# Patient Record
Sex: FEMALE | Race: BLACK OR AFRICAN AMERICAN | Employment: PART TIME | URBAN - METROPOLITAN AREA
[De-identification: names, ages, dates, MRNs, and addresses within clinical notes are randomized per-mention and may not be internally consistent; named-entity substitution may affect disease eponyms.]

---

## 2018-11-28 ENCOUNTER — HOSPITAL ENCOUNTER (OUTPATIENT)
Dept: MAMMOGRAPHY | Age: 48
Discharge: HOME | End: 2018-11-28
Attending: FAMILY MEDICINE
Payer: COMMERCIAL

## 2018-11-28 DIAGNOSIS — Z12.39 BREAST CANCER SCREENING: ICD-10-CM

## 2018-11-28 PROCEDURE — 77063 BREAST TOMOSYNTHESIS BI: CPT

## 2018-11-28 PROCEDURE — 77067 SCR MAMMO BI INCL CAD: CPT

## 2018-12-03 ENCOUNTER — HOSPITAL ENCOUNTER (OUTPATIENT)
Dept: LAB | Age: 48
Discharge: HOME | End: 2018-12-03
Payer: COMMERCIAL

## 2018-12-03 DIAGNOSIS — D50.9 MICROCYTIC ANEMIA: ICD-10-CM

## 2018-12-03 LAB
BASOPHILS # BLD: 0 K/UL (ref 0–0.2)
BASOPHILS NFR BLD: 0 % (ref 0–2)
DIFFERENTIAL METHOD BLD: ABNORMAL
EOSINOPHIL # BLD: 0.1 K/UL (ref 0–0.8)
EOSINOPHIL NFR BLD: 1 % (ref 0.5–7.8)
ERYTHROCYTE [DISTWIDTH] IN BLOOD BY AUTOMATED COUNT: 19.7 % (ref 11.9–14.6)
FERRITIN SERPL-MCNC: 7 NG/ML (ref 8–388)
HCT VFR BLD AUTO: 34.3 % (ref 35.8–46.3)
HGB BLD-MCNC: 10.6 G/DL (ref 11.7–15.4)
IMM GRANULOCYTES # BLD: 0 K/UL (ref 0–0.5)
IMM GRANULOCYTES NFR BLD AUTO: 0 % (ref 0–5)
IRON SATN MFR SERPL: 18 %
IRON SERPL-MCNC: 83 UG/DL (ref 35–150)
LYMPHOCYTES # BLD: 2.7 K/UL (ref 0.5–4.6)
LYMPHOCYTES NFR BLD: 39 % (ref 13–44)
MCH RBC QN AUTO: 24.4 PG (ref 26.1–32.9)
MCHC RBC AUTO-ENTMCNC: 30.9 G/DL (ref 31.4–35)
MCV RBC AUTO: 78.9 FL (ref 79.6–97.8)
MONOCYTES # BLD: 0.4 K/UL (ref 0.1–1.3)
MONOCYTES NFR BLD: 5 % (ref 4–12)
NEUTS SEG # BLD: 3.8 K/UL (ref 1.7–8.2)
NEUTS SEG NFR BLD: 55 % (ref 43–78)
NRBC # BLD: 0 K/UL (ref 0–0.2)
PLATELET # BLD AUTO: 315 K/UL (ref 150–450)
PMV BLD AUTO: 10.5 FL (ref 9.4–12.3)
RBC # BLD AUTO: 4.35 M/UL (ref 4.05–5.25)
TIBC SERPL-MCNC: 450 UG/DL (ref 250–450)
WBC # BLD AUTO: 7 K/UL (ref 4.3–11.1)

## 2018-12-03 PROCEDURE — 83540 ASSAY OF IRON: CPT

## 2018-12-03 PROCEDURE — 36415 COLL VENOUS BLD VENIPUNCTURE: CPT

## 2018-12-03 PROCEDURE — 85025 COMPLETE CBC W/AUTO DIFF WBC: CPT

## 2018-12-03 PROCEDURE — 82728 ASSAY OF FERRITIN: CPT

## 2019-03-04 ENCOUNTER — HOSPITAL ENCOUNTER (OUTPATIENT)
Dept: LAB | Age: 49
Discharge: HOME | End: 2019-03-04
Payer: COMMERCIAL

## 2019-03-04 DIAGNOSIS — D50.9 MICROCYTIC ANEMIA: ICD-10-CM

## 2019-03-04 DIAGNOSIS — D64.9 ANEMIA, UNSPECIFIED TYPE: ICD-10-CM

## 2019-03-04 LAB
ALBUMIN SERPL-MCNC: 4 G/DL (ref 3.5–5)
ALBUMIN/GLOB SERPL: 1.1 {RATIO} (ref 1.2–3.5)
ALP SERPL-CCNC: 86 U/L (ref 50–136)
ALT SERPL-CCNC: 33 U/L (ref 12–65)
ANION GAP SERPL CALC-SCNC: 4 MMOL/L (ref 7–16)
AST SERPL-CCNC: 13 U/L (ref 15–37)
BASOPHILS # BLD: 0 K/UL (ref 0–0.2)
BASOPHILS NFR BLD: 0 % (ref 0–2)
BILIRUB SERPL-MCNC: 0.7 MG/DL (ref 0.2–1.1)
BUN SERPL-MCNC: 10 MG/DL (ref 6–23)
CALCIUM SERPL-MCNC: 8.7 MG/DL (ref 8.3–10.4)
CHLORIDE SERPL-SCNC: 110 MMOL/L (ref 98–107)
CO2 SERPL-SCNC: 27 MMOL/L (ref 21–32)
CREAT SERPL-MCNC: 0.8 MG/DL (ref 0.6–1)
DIFFERENTIAL METHOD BLD: ABNORMAL
EOSINOPHIL # BLD: 0.1 K/UL (ref 0–0.8)
EOSINOPHIL NFR BLD: 1 % (ref 0.5–7.8)
ERYTHROCYTE [DISTWIDTH] IN BLOOD BY AUTOMATED COUNT: 15.1 % (ref 11.9–14.6)
FERRITIN SERPL-MCNC: 25 NG/ML (ref 8–388)
GLOBULIN SER CALC-MCNC: 3.8 G/DL (ref 2.3–3.5)
GLUCOSE SERPL-MCNC: 91 MG/DL (ref 65–100)
HCT VFR BLD AUTO: 37.3 % (ref 35.8–46.3)
HGB BLD-MCNC: 12.2 G/DL (ref 11.7–15.4)
IMM GRANULOCYTES # BLD AUTO: 0 K/UL (ref 0–0.5)
IMM GRANULOCYTES NFR BLD AUTO: 0 % (ref 0–5)
IRON SATN MFR SERPL: 32 %
IRON SERPL-MCNC: 129 UG/DL (ref 35–150)
LYMPHOCYTES # BLD: 2.4 K/UL (ref 0.5–4.6)
LYMPHOCYTES NFR BLD: 37 % (ref 13–44)
MCH RBC QN AUTO: 27.5 PG (ref 26.1–32.9)
MCHC RBC AUTO-ENTMCNC: 32.7 G/DL (ref 31.4–35)
MCV RBC AUTO: 84.2 FL (ref 79.6–97.8)
MONOCYTES # BLD: 0.4 K/UL (ref 0.1–1.3)
MONOCYTES NFR BLD: 6 % (ref 4–12)
NEUTS SEG # BLD: 3.6 K/UL (ref 1.7–8.2)
NEUTS SEG NFR BLD: 56 % (ref 43–78)
NRBC # BLD: 0 K/UL (ref 0–0.2)
PLATELET # BLD AUTO: 284 K/UL (ref 150–450)
PMV BLD AUTO: 10.9 FL (ref 9.4–12.3)
POTASSIUM SERPL-SCNC: 3.6 MMOL/L (ref 3.5–5.1)
PROT SERPL-MCNC: 7.8 G/DL (ref 6.3–8.2)
RBC # BLD AUTO: 4.43 M/UL (ref 4.05–5.25)
SODIUM SERPL-SCNC: 141 MMOL/L (ref 136–145)
TIBC SERPL-MCNC: 406 UG/DL (ref 250–450)
WBC # BLD AUTO: 6.5 K/UL (ref 4.3–11.1)

## 2019-03-04 PROCEDURE — 36415 COLL VENOUS BLD VENIPUNCTURE: CPT

## 2019-03-04 PROCEDURE — 80053 COMPREHEN METABOLIC PANEL: CPT

## 2019-03-04 PROCEDURE — 83540 ASSAY OF IRON: CPT

## 2019-03-04 PROCEDURE — 85025 COMPLETE CBC W/AUTO DIFF WBC: CPT

## 2019-03-04 PROCEDURE — 82728 ASSAY OF FERRITIN: CPT

## 2022-03-07 ENCOUNTER — HOSPITAL ENCOUNTER (OUTPATIENT)
Dept: MAMMOGRAPHY | Age: 52
Discharge: HOME | End: 2022-03-07
Attending: FAMILY MEDICINE
Payer: COMMERCIAL

## 2022-03-07 DIAGNOSIS — Z12.31 SCREENING MAMMOGRAM FOR BREAST CANCER: ICD-10-CM

## 2022-03-07 PROCEDURE — 77063 BREAST TOMOSYNTHESIS BI: CPT

## 2022-04-21 ENCOUNTER — HOSPITAL ENCOUNTER (OUTPATIENT)
Dept: GENERAL RADIOLOGY | Age: 52
Discharge: HOME | End: 2022-04-21

## 2022-04-21 DIAGNOSIS — M54.50 CHRONIC LEFT-SIDED LOW BACK PAIN WITHOUT SCIATICA: ICD-10-CM

## 2022-04-21 DIAGNOSIS — G89.29 CHRONIC LEFT-SIDED LOW BACK PAIN WITHOUT SCIATICA: ICD-10-CM

## 2022-06-07 ENCOUNTER — TELEPHONE (OUTPATIENT)
Dept: FAMILY MEDICINE CLINIC | Facility: CLINIC | Age: 52
End: 2022-06-07

## 2022-06-07 NOTE — TELEPHONE ENCOUNTER
Pt was called and informed that Ct shows an abnormal cervix and uterus. Informed that she needs to be followed by a GYN. Pt stated that she would call back with one that she would like to see.

## 2022-10-13 DIAGNOSIS — Z00.00 LABORATORY EXAMINATION ORDERED AS PART OF A ROUTINE GENERAL MEDICAL EXAMINATION: Primary | ICD-10-CM

## 2022-10-13 DIAGNOSIS — I10 ESSENTIAL (PRIMARY) HYPERTENSION: ICD-10-CM

## 2022-10-13 DIAGNOSIS — Z11.59 NEED FOR HEPATITIS C SCREENING TEST: ICD-10-CM

## 2022-10-20 ENCOUNTER — NURSE ONLY (OUTPATIENT)
Dept: FAMILY MEDICINE CLINIC | Facility: CLINIC | Age: 52
End: 2022-10-20
Payer: COMMERCIAL

## 2022-10-20 DIAGNOSIS — Z11.59 NEED FOR HEPATITIS C SCREENING TEST: ICD-10-CM

## 2022-10-20 DIAGNOSIS — Z00.00 LABORATORY EXAMINATION ORDERED AS PART OF A ROUTINE GENERAL MEDICAL EXAMINATION: Primary | ICD-10-CM

## 2022-10-20 DIAGNOSIS — I10 ESSENTIAL (PRIMARY) HYPERTENSION: ICD-10-CM

## 2022-10-20 LAB
DIFFERENTIAL METHOD BLD: NORMAL
ERYTHROCYTE [DISTWIDTH] IN BLOOD BY AUTOMATED COUNT: 13.9 % (ref 11.9–14.6)
HCT VFR BLD AUTO: 38 % (ref 35.8–46.3)
HGB BLD-MCNC: 12 G/DL (ref 11.7–15.4)
MCH RBC QN AUTO: 28.4 PG (ref 26.1–32.9)
MCHC RBC AUTO-ENTMCNC: 31.6 G/DL (ref 31.4–35)
MCV RBC AUTO: 90 FL (ref 82–102)
NRBC # BLD: 0 K/UL (ref 0–0.2)
PLATELET # BLD AUTO: 308 K/UL (ref 150–450)
PMV BLD AUTO: 11.5 FL (ref 9.4–12.3)
RBC # BLD AUTO: 4.22 M/UL (ref 4.05–5.2)
WBC # BLD AUTO: 5.8 K/UL (ref 4.3–11.1)

## 2022-10-20 PROCEDURE — 36415 COLL VENOUS BLD VENIPUNCTURE: CPT | Performed by: FAMILY MEDICINE

## 2022-10-21 LAB
ALBUMIN SERPL-MCNC: 3.8 G/DL (ref 3.5–5)
ALBUMIN/GLOB SERPL: 1.3 {RATIO} (ref 0.4–1.6)
ALP SERPL-CCNC: 76 U/L (ref 50–130)
ALT SERPL-CCNC: 24 U/L (ref 12–65)
ANION GAP SERPL CALC-SCNC: 7 MMOL/L (ref 2–11)
AST SERPL-CCNC: 13 U/L (ref 15–37)
BILIRUB SERPL-MCNC: 0.4 MG/DL (ref 0.2–1.1)
BUN SERPL-MCNC: 11 MG/DL (ref 6–23)
CALCIUM SERPL-MCNC: 9.2 MG/DL (ref 8.3–10.4)
CHLORIDE SERPL-SCNC: 110 MMOL/L (ref 101–110)
CHOLEST SERPL-MCNC: 151 MG/DL
CO2 SERPL-SCNC: 27 MMOL/L (ref 21–32)
CREAT SERPL-MCNC: 0.79 MG/DL (ref 0.6–1)
GLOBULIN SER CALC-MCNC: 3 G/DL (ref 2.8–4.5)
GLUCOSE SERPL-MCNC: 97 MG/DL (ref 65–100)
HDLC SERPL-MCNC: 66 MG/DL (ref 40–60)
HDLC SERPL: 2.3 {RATIO}
LDLC SERPL CALC-MCNC: 65.2 MG/DL
POTASSIUM SERPL-SCNC: 4 MMOL/L (ref 3.5–5.1)
PROT SERPL-MCNC: 6.8 G/DL (ref 6.3–8.2)
SODIUM SERPL-SCNC: 144 MMOL/L (ref 133–143)
TRIGL SERPL-MCNC: 99 MG/DL (ref 35–150)
TSH, 3RD GENERATION: 1.22 UIU/ML (ref 0.36–3.74)
VLDLC SERPL CALC-MCNC: 19.8 MG/DL (ref 6–23)

## 2022-10-22 LAB — HCV AB SER QL: NONREACTIVE

## 2022-10-27 ENCOUNTER — OFFICE VISIT (OUTPATIENT)
Dept: FAMILY MEDICINE CLINIC | Facility: CLINIC | Age: 52
End: 2022-10-27
Payer: COMMERCIAL

## 2022-10-27 VITALS
DIASTOLIC BLOOD PRESSURE: 84 MMHG | SYSTOLIC BLOOD PRESSURE: 136 MMHG | HEIGHT: 65 IN | BODY MASS INDEX: 23.32 KG/M2 | WEIGHT: 140 LBS

## 2022-10-27 DIAGNOSIS — R10.30 LOWER ABDOMINAL PAIN: ICD-10-CM

## 2022-10-27 DIAGNOSIS — R93.89 THICKENED ENDOMETRIUM: ICD-10-CM

## 2022-10-27 DIAGNOSIS — N88.9 ABNORMAL CERVIX FINDING: ICD-10-CM

## 2022-10-27 DIAGNOSIS — R03.0 ELEVATED BLOOD PRESSURE READING: ICD-10-CM

## 2022-10-27 DIAGNOSIS — Z00.00 ROUTINE GENERAL MEDICAL EXAMINATION AT A HEALTH CARE FACILITY: Primary | ICD-10-CM

## 2022-10-27 PROCEDURE — 99396 PREV VISIT EST AGE 40-64: CPT | Performed by: FAMILY MEDICINE

## 2022-10-27 ASSESSMENT — PATIENT HEALTH QUESTIONNAIRE - PHQ9
SUM OF ALL RESPONSES TO PHQ QUESTIONS 1-9: 0
SUM OF ALL RESPONSES TO PHQ QUESTIONS 1-9: 0
1. LITTLE INTEREST OR PLEASURE IN DOING THINGS: 0
SUM OF ALL RESPONSES TO PHQ QUESTIONS 1-9: 0
SUM OF ALL RESPONSES TO PHQ QUESTIONS 1-9: 0
2. FEELING DOWN, DEPRESSED OR HOPELESS: 0
SUM OF ALL RESPONSES TO PHQ9 QUESTIONS 1 & 2: 0

## 2022-10-27 NOTE — PROGRESS NOTES
SUBJECTIVE:   Nu Washington is a 45 y.o. female here for CPX. At previous visit the patient had complained of ongoing lower abdominal discomfort. Work-up was unremarkable. Please refer to prior notes for details. She was treated with probiotics and improved subjectively. She was referred to GI as she was overdue for screening colonoscopy. Work-up by GI included colonoscopy that was unremarkable. A CT scan of her abdomen and pelvis was ordered by GI. This revealed an abnormal appearing cervix and a thickened endometrium. Patient was contacted with these results and encouraged to be referred to GYN. She stated at that time that she wanted to identify a GYN to be referred to and would contact us back. Patient was explained the potential risk that these findings could represent malignancy. She verbalized understanding. She states that she got distracted and has not come up with someone she would like to see. However while waiting to see me this morning she states that she would like to see Dr. Marleen Harada. Patient reports that she is continuing to have intermittent menstrual cycles. Her last Pap smear was normal in 2018. Patient reports abdominal discomfort has resolved. She reports no chest pain, shortness of breath, orthopnea or PND. HPI  See above    Past Medical History, Past Surgical History, Family history, Social History, and Medications were all reviewed with the patient today and updated as necessary. Current Outpatient Medications   Medication Sig Dispense Refill    mometasone (ELOCON) 0.1 % cream Apply topically daily (Patient not taking: Reported on 10/27/2022)       No current facility-administered medications for this visit. No Known Allergies  There is no problem list on file for this patient. No past medical history on file. No past surgical history on file.   Family History   Problem Relation Age of Onset    Diabetes Father     Breast Cancer Neg Hx Social History     Tobacco Use    Smoking status: Never    Smokeless tobacco: Never   Substance Use Topics    Alcohol use: No         Review of Systems  See above    OBJECTIVE:  /84   Ht 5' 5\" (1.651 m)   Wt 140 lb (63.5 kg)   BMI 23.30 kg/m²      Physical Exam  Vitals reviewed. Exam conducted with a chaperone present. Constitutional:       General: She is not in acute distress. Appearance: Normal appearance. HENT:      Head: Normocephalic and atraumatic. Right Ear: Tympanic membrane, ear canal and external ear normal. There is no impacted cerumen. Left Ear: Tympanic membrane, ear canal and external ear normal. There is no impacted cerumen. Nose: Nose normal.      Mouth/Throat:      Mouth: Mucous membranes are moist.      Pharynx: Oropharynx is clear. No oropharyngeal exudate or posterior oropharyngeal erythema. Eyes:      General: No scleral icterus. Extraocular Movements: Extraocular movements intact. Conjunctiva/sclera: Conjunctivae normal.      Pupils: Pupils are equal, round, and reactive to light. Neck:      Vascular: No carotid bruit. Cardiovascular:      Rate and Rhythm: Normal rate and regular rhythm. Pulses: Normal pulses. Heart sounds: Normal heart sounds. No murmur heard. Pulmonary:      Effort: Pulmonary effort is normal. No respiratory distress. Breath sounds: Normal breath sounds. No wheezing or rhonchi. Chest:   Breasts:     Right: Normal. No swelling, inverted nipple, mass, nipple discharge, skin change or tenderness. Left: Normal. No swelling, inverted nipple, mass, nipple discharge, skin change or tenderness. Abdominal:      General: Abdomen is flat. Bowel sounds are normal. There is no distension. Palpations: Abdomen is soft. There is no mass. Tenderness: There is no abdominal tenderness. There is no guarding or rebound. Hernia: No hernia is present.    Musculoskeletal:         General: Normal range of motion. Cervical back: Normal range of motion and neck supple. Right lower leg: No edema. Left lower leg: No edema. Lymphadenopathy:      Cervical: No cervical adenopathy. Skin:     General: Skin is warm. Findings: No rash. Neurological:      General: No focal deficit present. Mental Status: She is alert and oriented to person, place, and time. Cranial Nerves: No cranial nerve deficit. Deep Tendon Reflexes: Reflexes normal.   Psychiatric:         Mood and Affect: Mood normal.         Behavior: Behavior normal.         Thought Content: Thought content normal.         Judgment: Judgment normal.       Medical problems and test results were reviewed with the patient today. ASSESSMENT and PLAN    1.  CPX. Anticipatory guidance discussed including the importance of sunscreen use, helmet use and seatbelt use. Screening colonoscopy is up-to-date. Mammography is up-to-date. Patient is due for Pap smear. Referral to GYN will be made accordingly. Screening laboratory data including CBC, metabolic panel, TSH and lipid panel unremarkable. 2.  History of elevated blood pressure reading. BP is currently normal at 136/84. Continue dietary management, salt restriction and caffeine restriction. Monitor outside the office. 3.  Lower abdominal pain. Negative work-up including colonoscopy. Resolved. 4.  Abnormal cervix. Abnormal appearing cervix on CT scan. Strongly encouraged GYN follow-up. Patient states that she will do so. Referral will be made. 5.  Thickened endometrium. As above. Elements of this note have been dictated using speech recognition software. As a result, errors of speech recognition may have occurred.

## 2022-11-22 ENCOUNTER — OFFICE VISIT (OUTPATIENT)
Dept: OBGYN CLINIC | Age: 52
End: 2022-11-22

## 2022-11-22 VITALS
SYSTOLIC BLOOD PRESSURE: 152 MMHG | WEIGHT: 137 LBS | HEIGHT: 65 IN | DIASTOLIC BLOOD PRESSURE: 88 MMHG | BODY MASS INDEX: 22.82 KG/M2

## 2022-11-22 DIAGNOSIS — Z12.4 SCREENING FOR CERVICAL CANCER: ICD-10-CM

## 2022-11-22 DIAGNOSIS — R93.89 THICKENED ENDOMETRIUM: Primary | ICD-10-CM

## 2022-11-22 DIAGNOSIS — Z80.49 FAMILY HISTORY OF UTERINE CANCER: ICD-10-CM

## 2022-11-22 DIAGNOSIS — Z11.51 SCREENING FOR HUMAN PAPILLOMAVIRUS (HPV): ICD-10-CM

## 2022-11-22 NOTE — PROGRESS NOTES
Carolyn Boyer  47 y.o.  1970  [id number]    Today:  2022    Chief Complaint   Patient presents with    Consultation     Referral for thickened endometrium       Subjective:    47 y.o. female, [id number] (), presents today with concerns regarding:   Incidental finding:  thickened endometrium noted on 2022 CT (indications: constipation, LBP that radiated to her Left upper abd, LLQ pain, abd'l pain)  Has monthly menses, ? perimenopause? Denies hot flashes/night sweats    Dr. Elaina Metzger is following her 81 yo mother for uterine cancer, states she is in good health but she is afraid for her mom to have surgery, txg w/ likely Megace qid (pt can't recall, \"medicine that starts w/ a  \"M\"). 2022 Dang CT:     Pelvis: Cervix appears to be of lower attenuation than the myometrium. Adjacent vaginal wall appears to be possibly thickened. Normal cervical appearance is variable but direct visualization and Pap smear suggested to exclude potential for pathology in this area. Neoplastic disease can potentially present with this appearance. Endometrial thickness measures approximately 12 mm. This may be normal in a luteal phase of a normal menstrual cycle but in a postmenopausal patient is abnormally thickened and raises concern for hyperplasia and/or neoplasia. Endometrial thickening can also be seen with cervical stenosis and/or obstruction. Impression:    1. Uterine cervix appears prominent with possible decreased attenuation of the cervix and thickening of the adjacent vaginal walls. Further evaluation to assess for inflammatory or neoplastic etiology suggested. 2. Endometrial thickness is 12 mm. This is nonspecific but can be abnormal in due to hyperplasia in a postmenopausal patient. Patient's last menstrual period was 10/06/2022.       OB History    Para Term  AB Living   3 3 2 1 0 3   SAB IAB Ectopic Molar Multiple Live Births   0 0 0 0 0 3   Obstetric Comments   [id number] ( Primary Care Provider: No Known Provider    Reason for Consultation: Follow-up lap sunita    Chief Complaint:   Chief Complaint   Patient presents with   • Post-op     s/p lap sunita       History of present illness:  I saw the patient in the office today as a followup from their recent laparoscopic cholecystectomy on March 15th.  Patient states for about a week she ran a high fever, chills,dark urine and RUQ pain. Patient went to the ER on Saturday.  CT scan showed fluid collection in the gallbladder fossa /inferior aspect of the right lobe of the liver.  Today patient does not have a fever.  She was started on antibiotics. She is still having occasional RUQ pain, watery diarrhea and constant nausea.  She does state that she does not have a fever any more, she is tolerating a normal diet, she still has her ERCP stent in place.    Vital Signs:        Physical Exam:   General Appearance:    Alert, cooperative, in no acute distress   Abdomen:     no masses, no organomegaly, soft non-tender, non-distended, no guarding, wounds are well healed     Assessment / Plan :    1. Right upper quadrant abdominal pain    2. Urinary tract infection without hematuria, site unspecified        I did discuss the situation with the patient today in the office and they seem to be doing better.  I will have the patient obtain a right upper quadrant ultrasound today and we will see her back in one week with repeat labs.  I did ask the patient to stay on her current Levaquin and Flagyl. She did have a UTI and is currently being treated for this, I will followup for this also.     Benjamin Reaves MD  04/17/17                 x 3)       No Known Allergies    Current Outpatient Medications   Medication Sig    Multiple Vitamin (MULTI-VITAMIN PO) Take 1 capsule by mouth daily     No current facility-administered medications for this visit. History reviewed. No pertinent past medical history. Past Surgical History:   Procedure Laterality Date    COLONOSCOPY      6/2022    TUBAL LIGATION         Social History     Socioeconomic History    Marital status: Single     Spouse name: None    Number of children: None    Years of education: None    Highest education level: None   Tobacco Use    Smoking status: Never    Smokeless tobacco: Never   Vaping Use    Vaping Use: Never used   Substance and Sexual Activity    Alcohol use: No    Drug use: No       Family History   Problem Relation Age of Onset    Uterine Cancer Mother     Diabetes Father     Breast Cancer Neg Hx      Review of Systems    Objective: BP (!) 152/88   Ht 5' 5\" (1.651 m)   Wt 137 lb (62.1 kg)   LMP 10/06/2022   BMI 22.80 kg/m²   Ruben Mandel is a 47 y.o. female who appears pleasant, well developed, well nourished, with good attention to hygiene and body habitus. In no acute distress. Psych:  Oriented to person, place and time. Mood and affect are normal and appropriate to the situation. Short-term memory and understanding intact    Eyes: Sclera are clear. Conjunctiva and lids within normal limits. No icterus. Ears and Nose: no gross deformities to visual inspection, gross hearing intact   Lymph Nodes:  No groin lymphadenopathy noted. Skin: No skin rash, subcutaneous nodules, lesions or ulcers observed  Respiratory:   Breathing is non-labored. Lungs clear to auscultation without wheezing or rhonchi   Cardiovascular: RRR, Heart auscultation reveals no murmurs, rubs or gallops appreciated. Abdomen: Soft.  No masses, nontender, no guarding or rebound   External genitalia: normal appearing, minimal erythema, no lesions  Vagina: pink with normal rugations, no lesions  Cervix: normal appearing, no lesions,  no exudate, no CMT, pap collected  Uterus: midline, normal size, shape and contour  Adnexa: bilaterally  no masses, non tender  Rectovaginal exam:  Normal sphincter tone, no masses     EMB:  The patient consent was signed and dated. Time out was performed to confirm procedure. A bivalve speculum was placed in the vagina. The cervix was washed with Betadine. The anterior lip was grasped with a single tooth tenaculum. The uterus was sounded to 9.5 cm. An endometrial pipette was inserted into the endometrial cavity. Suction was applied to the pipette to obtain a good specimen. 2 passes were made to ensure an adequate specimen. Specimen was sent to pathology for evaluation. All instruments were removed from the vagina. Patient tolerated procedure well. A/P:  1. Follow up:  48 yo, , continued monthly menses, ? perimenopause? Denies hot flashes/night sweats, ? fsh. Thickened endometrium per CT:  EMB collected today, ? fsh.     2.  Mom uterine cancer, pt attributes to IUD her mom had in for ~ 48 y, was removed ~ 1.5 y ago and \"scratched the lining\" during removal thus causing cancer. 3.  /88-->HTN today, pt attributes to \"white coat HTN\". Advised pt to monitor her BPs daily (qd-bid), keep a log, review w/her PCP,  Dr. Robe Perez     4. Health maintenance:  per pt colonoscopy  is current 2022  MMG 2022 current. Has 3 grown children ~ 33 yo, 34 yo and 27 yo. Re ROS--> non gynecologic concerns referred back to her PCP or appropriate specialist.       ICD-10-CM    1. Thickened endometrium  J75.07 Follicle Stimulating Hormone     20456 - [place] BIOPSY OF UTERUS LINING     [facility]     Follicle Stimulating Hormone     [facility]      2. Screening for cervical cancer  Z12.4 PAP IG, Aptima HPV and rfx 16/18,45 ([id number])     PAP IG, Aptima HPV and rfx 16/18,45 ([id number])      3.  Screening for human papillomavirus (HPV)  Z11.51 PAP IG, Aptima HPV and rfx 16/18,45 ([id number])     PAP IG, Aptima HPV and rfx 16/18,45 ([id number])      4. Family history of uterine cancer  Z80.49            Orders Placed This Encounter   Procedures    Follicle Stimulating Hormone    PAP IG, Aptima HPV and rfx 16/18,45 ([id number])    [facility]    [facility]    5 - [place] BIOPSY OF UTERUS LINING     More than 50% of this 35+ minute visit was spent addressing the above patient concerns including:  assessment, extensive chart review, physical exam and counseling the patient about the above concerns including evaluation plan and treatment strategies. Long conversation with patient, all her questions answered and addressed all her concerns.     Jyoti Live MD, Elias Koehler

## 2022-11-23 LAB — FSH SERPL-ACNC: 6.1 MIU/ML

## 2022-11-28 LAB
CYTOLOGIST CVX/VAG CYTO: NORMAL
CYTOLOGY CVX/VAG DOC THIN PREP: NORMAL
HPV APTIMA: NEGATIVE
HPV GENOTYPE REFLEX: NORMAL
Lab: NORMAL
PATH REPORT.FINAL DX SPEC: NORMAL
STAT OF ADQ CVX/VAG CYTO-IMP: NORMAL

## 2022-11-29 ENCOUNTER — OFFICE VISIT (OUTPATIENT)
Dept: OBGYN CLINIC | Age: 52
End: 2022-11-29

## 2022-11-29 VITALS — BODY MASS INDEX: 23.3 KG/M2 | DIASTOLIC BLOOD PRESSURE: 86 MMHG | SYSTOLIC BLOOD PRESSURE: 132 MMHG | WEIGHT: 140 LBS

## 2022-11-29 DIAGNOSIS — R93.89 THICKENED ENDOMETRIUM: Primary | ICD-10-CM

## 2022-11-29 DIAGNOSIS — Z80.49 FAMILY HISTORY OF UTERINE CANCER: ICD-10-CM

## 2022-11-29 NOTE — PROGRESS NOTES
Follow up visit    Nicolasa Cantu   47 y.o.  1970  [id number]    Today:  22    Chief Complaint   Patient presents with    Follow-up     HPI:     46 yo,  ( x 3), 2022 CT incidental finding --> thickened endometrium. Reports regular monthly menses, last 3-5 d (pads), denies hot flashes/night sweats. Recent FSH 6. Denies IMB/PCB  Her mom is being txd by Dr. Margoth Sutherland for uterine cancer w/ Megace. Notes an odorless vaginal discharge when she eats too many Reeses cups,states she often eats them \"\". Discharge occurs when she eats a bag/d x months. If she stops for 2-3m the discharge resoles,     22 US today, CD 26:  ENLARGED, HETEROGENEOUS, UT 10/6/5, vol 157  ENDOMETRIUM - 19.23MM, THICKENED, VASCULARITY PRESENT. RT OV - APPEARS WNL  LT OV - COMPLEX CYST (HEMORRHAGIC APPEARANE) WITH  BLOOD FLOW AROUND THE PERIPHERY MEASURING 1.9 X 1.0 X 1.5CM. TRACE AMOUNT OF FF IN THE POSTERIOR CDS    BC:   tubal    OB History          3    Para   3    Term   2       1    AB        Living   3         SAB        IAB        Ectopic        Molar        Multiple        Live Births   3              Patient's last menstrual period was 2022 (exact date). Past Surgical History:   Procedure Laterality Date    TUBAL LIGATION       History reviewed. No pertinent past medical history. Family History   Problem Relation Age of Onset    Diabetes Father     Cancer Mother         uterine? Breast Cancer Neg Hx        Review of Systems    Updated from patient's \"Review of Systems\" form that patient completed. Physical Exam:   /86   Wt 140 lb (63.5 kg)   LMP 2022 (Exact Date)   BMI 23.30 kg/m²     Patient is a 47 y.o. female who appears pleasant, in no apparent distress, her given age, well developed, well nourished and with good attention to hygiene and body habitus. Oriented to person, place and time. Mood and affect normal and appropriate to situation. Head is normocephalic, atraumatic, without any gross head or neck masses. PE not repeated    A/P:  1. Follow up: Thickened endometrium, recent EMB benign. [address] = 6 h/o mom has uterine cancer and endometrium is very thick/vascular. Repeat US ~ CD 7-11, tentatively plan Fractional D & C Hysteroscopy w/ Myosure, consider canceling Fractional D & C Hysteroscopy w/ Myosure if CD 7-11 endometrium is more normal appearing. 11/22/2022  DIAGNOSIS        A:  \" ENDOMETRIAL BIOPSY\":        FRAGMENTS OF NON-SECRETORY ENDOMETRIUM      2.  Follow up small complex left ov cyst ~4 m. Re ROS--> non gynecologic concerns referred back to her PCP or appropriate specialist.     Orders Placed This Encounter   Procedures    AMB POC US, TRANSVAGINAL        Diagnosis Orders   1. Thickened endometrium  AMB POC US, TRANSVAGINAL        Complex high risk decision making, many questions answered. Chart reviewed in detail including previous office notes:  PMH, PSH, POB, PGYN, FH, MEDICATIONS and allergies. Outside medical records reviewed. Spent ~ 35+  minutes  discussing a variety of problems, including all of the above. Decision to schedule surgery. Treatment options reviewed, pros/cons and alternative treatment options reviewed. Goals and expectations established. Signs/sx of persistent worrisome symptoms/side effects of treatment discussed. Follow up planned. Pt encouraged to notify the office/follow up sooner if she has additional questions/concerns.      Andrew Johnson MD, Luca Lin

## 2022-12-04 NOTE — PROGRESS NOTES
Name: Eric Atkins     : 1970  Insurance: Address:   Home:   Work:    Today:  2022    Physicians Information    Recommended Surgery: Fractional D & C hysteroscopy with myosure. Place: [facility]    When: coordinate w/ pt    Diagnosis:   Diagnosis Orders   1.  Thickened endometrium  AMB POC US, TRANSVAGINAL        Time Needed:  1 hour  Yaw Castañeda MD, MD  Assistant Needed: none  Special Request:    Surgery Department Information    Date Scheduled: _____________________ Hospital Pre-Op: ______________________        Time Scheduled : ____________________ Surgery Entered: _____________________    Facility: ____________________________ Patient Notified: ______________________    Pre-Op: _______________________ Orders Completed: ___________________    Mino Nunez MD, FACOG

## 2022-12-06 ENCOUNTER — TELEPHONE (OUTPATIENT)
Dept: OBGYN CLINIC | Age: 52
End: 2022-12-06

## 2022-12-06 NOTE — TELEPHONE ENCOUNTER
Spoke with patient regarding order I received from Dr Jose Carranza to schedule surgery. Pt states she and Dr Jose Carranza discussed repeating an US on CD 7-11 and then determine if Hysteroscopy D&C was needed. Pt has US scheduled for 12-12-22. Will wait for additional orders from Dr Jose Carranza.

## 2022-12-13 ENCOUNTER — OFFICE VISIT (OUTPATIENT)
Dept: OBGYN CLINIC | Age: 52
End: 2022-12-13

## 2022-12-13 VITALS — SYSTOLIC BLOOD PRESSURE: 124 MMHG | DIASTOLIC BLOOD PRESSURE: 84 MMHG | WEIGHT: 138 LBS | BODY MASS INDEX: 22.96 KG/M2

## 2022-12-13 DIAGNOSIS — R93.89 THICKENED ENDOMETRIUM: Primary | ICD-10-CM

## 2022-12-13 DIAGNOSIS — Z80.49 FAMILY HISTORY OF UTERINE CANCER: ICD-10-CM

## 2022-12-13 NOTE — PROGRESS NOTES
Follow up visit    Anila Ryan   47 y.o.  1970  [id number]    Today:  2022    Chief Complaint   Patient presents with    Follow-up     Thickened endoetrium     HPI:     Follow up. 48 yo,  (), US d/w pt, endometrium is much thinner, recent fsh 6, not c/w menopause. 2022 US today:  HETEROGENOUS UT. , vol 72  ENDOMETRIUM MEASURES 5.3MM WITH VERY MINIMAL  FLUID IN THE FUNDAL CAVITY. 1 SMALL, SUBSEROSAL FIBROID SEEN FUNDAL TO THE RT. 1.1 X 0.9 X 0.6CM. ANOTHER SMALL, QUESTIONABLE INTRAMURAL FIBROID SEEN  JUST ANTERIOR TO THE LINING. (RT) <1.0CM. RT OV-2 TINY FOLLICLES SEEN. BOTH <1.0CM. LT OV- SMALL, SIMPLE PARAOVARIAN CYST LEFT OF THE OV. 1.0 X 0.7 X 0.9CM. NO FF.      ov   Consultation       Referral for thickened endometrium         Subjective:    47 y.o. female, [id number] (), ov today for:   Incidental finding:  thickened endometrium noted on 2022 CT (indications: constipation, LBP that radiated to her Left upper abd, LLQ pain, abd'l pain)  Has monthly menses, ? perimenopause? Denies hot flashes/night sweats     Dr. Alo Esteban is following her 79 yo mother for uterine cancer, states she is in good health but she is afraid for her mom to have surgery, txg w/ likely Megace qid (pt can't recall, \"medicine that starts w/ a  \"M\"). 2022 Dang CT:     Pelvis: Cervix appears to be of lower attenuation than the myometrium. Adjacent vaginal wall appears to be possibly thickened. Normal cervical appearance is variable but direct visualization and Pap smear suggested to exclude potential for pathology in this area. Neoplastic disease can potentially present with this appearance. Endometrial thickness measures approximately 12 mm. This may be normal in a luteal phase of a normal menstrual cycle but in a postmenopausal patient is abnormally thickened and raises concern for hyperplasia and/or neoplasia.  Endometrial thickening can also be seen with cervical stenosis and/or obstruction. Impression:    1. Uterine cervix appears prominent with possible decreased attenuation of the cervix and thickening of the adjacent vaginal walls. Further evaluation to assess for inflammatory or neoplastic etiology suggested. 2. Endometrial thickness is 12 mm. This is nonspecific but can be abnormal in due to hyperplasia in a postmenopausal patient. Patient's last menstrual period was 10/06/2022. A/P:  1. Follow up:  46 yo, , continued monthly menses, ? perimenopause? Denies hot flashes/night sweats, ? fsh. Thickened endometrium per CT:  EMB collected today, ? fsh.      2.  Mom uterine cancer, pt attributes to IUD her mom had in for ~ 48 y, was removed ~ 1.5 y ago and \"scratched the lining\" during removal thus causing cancer. 3.  /88-->HTN today, pt attributes to \"white coat HTN\". Advised pt to monitor her BPs daily (qd-bid), keep a log, review w/her PCP,  Dr. Jos Chaudhry      4. Health maintenance:  per pt colonoscopy  is current 2022  MMG 2022 current. Has 3 grown children ~ 31 yo, 36 yo and 27 yo. OB History          3    Para   3    Term   2       1    AB        Living   3         SAB        IAB        Ectopic        Molar        Multiple        Live Births   3          Obstetric Comments   [id number] ( x 3)             No LMP recorded. Past Surgical History:   Procedure Laterality Date    COLONOSCOPY      2022    TUBAL LIGATION       History reviewed. No pertinent past medical history. Family History   Problem Relation Age of Onset    Uterine Cancer Mother     Diabetes Father     Breast Cancer Neg Hx        Review of Systems    Updated from patient's \"Review of Systems\" form that patient completed.        Physical Exam:   /84   Wt 138 lb (62.6 kg)   BMI 22.96 kg/m²     Patient is a 47 y.o. female who appears pleasant, in no apparent distress, her given age, well developed, well nourished and with good attention to

## 2023-10-25 ENCOUNTER — NURSE ONLY (OUTPATIENT)
Dept: FAMILY MEDICINE CLINIC | Facility: CLINIC | Age: 53
End: 2023-10-25
Payer: COMMERCIAL

## 2023-10-25 DIAGNOSIS — Z00.00 LABORATORY EXAMINATION ORDERED AS PART OF A ROUTINE GENERAL MEDICAL EXAMINATION: Primary | ICD-10-CM

## 2023-10-25 LAB
ALBUMIN SERPL-MCNC: 3.7 G/DL (ref 3.5–5)
ALBUMIN/GLOB SERPL: 1.2 (ref 0.4–1.6)
ALP SERPL-CCNC: 76 U/L (ref 50–136)
ALT SERPL-CCNC: 18 U/L (ref 12–65)
ANION GAP SERPL CALC-SCNC: 2 MMOL/L (ref 2–11)
AST SERPL-CCNC: 13 U/L (ref 15–37)
BILIRUB SERPL-MCNC: 0.5 MG/DL (ref 0.2–1.1)
BUN SERPL-MCNC: 9 MG/DL (ref 6–23)
CALCIUM SERPL-MCNC: 8.7 MG/DL (ref 8.3–10.4)
CHLORIDE SERPL-SCNC: 110 MMOL/L (ref 101–110)
CHOLEST SERPL-MCNC: 148 MG/DL
CO2 SERPL-SCNC: 29 MMOL/L (ref 21–32)
CREAT SERPL-MCNC: 0.9 MG/DL (ref 0.6–1)
GLOBULIN SER CALC-MCNC: 3 G/DL (ref 2.8–4.5)
GLUCOSE SERPL-MCNC: 86 MG/DL (ref 65–100)
GRANS ABSOLUTE, POC: 3.2 K/UL
GRANULOCYTES %, POC: 52.6 %
HDLC SERPL-MCNC: 53 MG/DL (ref 40–60)
HDLC SERPL: 2.8
HEMATOCRIT, POC: ABNORMAL %
HEMOGLOBIN, POC: ABNORMAL G/DL
LDLC SERPL CALC-MCNC: 61.2 MG/DL
LYMPHOCYTE %, POC: 40.5 %
LYMPHS ABSOLUTE, POC: 2.4 K/UL
MCH, POC: ABNORMAL PG (ref 40–?)
MCHC, POC: 31.6
MCV, POC: 87.4
MONOCYTE %, POC: 6.9 %
MONOCYTE, ABSOLUTE POC: 0.4 K/UL
MPV, POC: 8.3 FL
PLATELET COUNT, POC: 276 K/UL
POTASSIUM SERPL-SCNC: 3.8 MMOL/L (ref 3.5–5.1)
PROT SERPL-MCNC: 6.7 G/DL (ref 6.3–8.2)
RBC, POC: ABNORMAL M/UL
RDW, POC: 13.4 %
SODIUM SERPL-SCNC: 141 MMOL/L (ref 133–143)
TRIGL SERPL-MCNC: 169 MG/DL (ref 35–150)
TSH, 3RD GENERATION: 0.89 UIU/ML (ref 0.36–3.74)
VLDLC SERPL CALC-MCNC: 33.8 MG/DL (ref 6–23)
WBC, POC: 6 K/UL

## 2023-10-25 PROCEDURE — 36415 COLL VENOUS BLD VENIPUNCTURE: CPT | Performed by: FAMILY MEDICINE

## 2023-10-25 PROCEDURE — 85025 COMPLETE CBC W/AUTO DIFF WBC: CPT | Performed by: FAMILY MEDICINE

## 2023-11-02 ENCOUNTER — OFFICE VISIT (OUTPATIENT)
Dept: FAMILY MEDICINE CLINIC | Facility: CLINIC | Age: 53
End: 2023-11-02
Payer: COMMERCIAL

## 2023-11-02 VITALS
WEIGHT: 145.4 LBS | DIASTOLIC BLOOD PRESSURE: 74 MMHG | SYSTOLIC BLOOD PRESSURE: 122 MMHG | HEIGHT: 65 IN | BODY MASS INDEX: 24.22 KG/M2

## 2023-11-02 DIAGNOSIS — E78.1 PURE HYPERGLYCERIDEMIA: ICD-10-CM

## 2023-11-02 DIAGNOSIS — Z00.00 ROUTINE GENERAL MEDICAL EXAMINATION AT A HEALTH CARE FACILITY: Primary | ICD-10-CM

## 2023-11-02 DIAGNOSIS — D64.9 ANEMIA, UNSPECIFIED TYPE: ICD-10-CM

## 2023-11-02 DIAGNOSIS — R93.89 THICKENED ENDOMETRIUM: ICD-10-CM

## 2023-11-02 DIAGNOSIS — N92.4 EXCESSIVE BLEEDING IN PREMENOPAUSAL PERIOD: ICD-10-CM

## 2023-11-02 PROCEDURE — 99396 PREV VISIT EST AGE 40-64: CPT | Performed by: FAMILY MEDICINE

## 2023-11-02 ASSESSMENT — PATIENT HEALTH QUESTIONNAIRE - PHQ9
SUM OF ALL RESPONSES TO PHQ QUESTIONS 1-9: 0
2. FEELING DOWN, DEPRESSED OR HOPELESS: 0
SUM OF ALL RESPONSES TO PHQ QUESTIONS 1-9: 0
1. LITTLE INTEREST OR PLEASURE IN DOING THINGS: 0
SUM OF ALL RESPONSES TO PHQ QUESTIONS 1-9: 0
SUM OF ALL RESPONSES TO PHQ QUESTIONS 1-9: 0
SUM OF ALL RESPONSES TO PHQ9 QUESTIONS 1 & 2: 0

## 2023-11-09 ENCOUNTER — OFFICE VISIT (OUTPATIENT)
Dept: OBGYN CLINIC | Age: 53
End: 2023-11-09
Payer: COMMERCIAL

## 2023-11-09 VITALS
HEIGHT: 63 IN | DIASTOLIC BLOOD PRESSURE: 74 MMHG | BODY MASS INDEX: 25.52 KG/M2 | WEIGHT: 144 LBS | SYSTOLIC BLOOD PRESSURE: 128 MMHG

## 2023-11-09 DIAGNOSIS — R23.2 HOT FLASHES: ICD-10-CM

## 2023-11-09 DIAGNOSIS — N60.01 BREAST CYST, RIGHT: ICD-10-CM

## 2023-11-09 DIAGNOSIS — Z01.419 WELL WOMAN EXAM WITH ROUTINE GYNECOLOGICAL EXAM: Primary | ICD-10-CM

## 2023-11-09 DIAGNOSIS — N92.4 ABNORMAL PERIMENOPAUSAL BLEEDING: ICD-10-CM

## 2023-11-09 DIAGNOSIS — Z11.51 SCREENING FOR HPV (HUMAN PAPILLOMAVIRUS): ICD-10-CM

## 2023-11-09 DIAGNOSIS — Z12.4 SCREENING FOR CERVICAL CANCER: ICD-10-CM

## 2023-11-09 DIAGNOSIS — Z12.31 ENCOUNTER FOR SCREENING MAMMOGRAM FOR MALIGNANT NEOPLASM OF BREAST: ICD-10-CM

## 2023-11-09 LAB — FSH SERPL-ACNC: 3.9 MIU/ML

## 2023-11-09 PROCEDURE — 99396 PREV VISIT EST AGE 40-64: CPT | Performed by: OBSTETRICS & GYNECOLOGY

## 2023-11-09 SDOH — ECONOMIC STABILITY: TRANSPORTATION INSECURITY
IN THE PAST 12 MONTHS, HAS LACK OF TRANSPORTATION KEPT YOU FROM MEETINGS, WORK, OR FROM GETTING THINGS NEEDED FOR DAILY LIVING?: NO

## 2023-11-09 SDOH — ECONOMIC STABILITY: FOOD INSECURITY: WITHIN THE PAST 12 MONTHS, YOU WORRIED THAT YOUR FOOD WOULD RUN OUT BEFORE YOU GOT MONEY TO BUY MORE.: NEVER TRUE

## 2023-11-09 SDOH — ECONOMIC STABILITY: INCOME INSECURITY: HOW HARD IS IT FOR YOU TO PAY FOR THE VERY BASICS LIKE FOOD, HOUSING, MEDICAL CARE, AND HEATING?: NOT VERY HARD

## 2023-11-09 SDOH — ECONOMIC STABILITY: FOOD INSECURITY: WITHIN THE PAST 12 MONTHS, THE FOOD YOU BOUGHT JUST DIDN'T LAST AND YOU DIDN'T HAVE MONEY TO GET MORE.: NEVER TRUE

## 2023-11-09 SDOH — ECONOMIC STABILITY: HOUSING INSECURITY
IN THE LAST 12 MONTHS, WAS THERE A TIME WHEN YOU DID NOT HAVE A STEADY PLACE TO SLEEP OR SLEPT IN A SHELTER (INCLUDING NOW)?: NO

## 2023-11-15 LAB
COLLECTION METHOD: NORMAL
CYTOLOGIST CVX/VAG CYTO: NORMAL
CYTOLOGY CVX/VAG DOC THIN PREP: NORMAL
DATE OF LMP: NORMAL
HPV APTIMA: NEGATIVE
Lab: NORMAL
OTHER PT INFO: NORMAL
PAP SOURCE: NORMAL
PATH REPORT.FINAL DX SPEC: NORMAL
PREV CYTO INFO: NEGATIVE
PREV TREATMENT RESULTS: NORMAL
PREV TREATMENT: NORMAL
STAT OF ADQ CVX/VAG CYTO-IMP: NORMAL

## 2023-11-28 ENCOUNTER — HOSPITAL ENCOUNTER (OUTPATIENT)
Dept: MAMMOGRAPHY | Age: 53
Discharge: HOME | End: 2023-12-01
Attending: OBSTETRICS & GYNECOLOGY
Payer: COMMERCIAL

## 2023-11-28 DIAGNOSIS — N60.01 BREAST CYST, RIGHT: ICD-10-CM

## 2023-11-28 PROCEDURE — G0279 TOMOSYNTHESIS, MAMMO: HCPCS

## 2023-11-28 PROCEDURE — 76642 ULTRASOUND BREAST LIMITED: CPT

## 2023-12-04 ENCOUNTER — OFFICE VISIT (OUTPATIENT)
Dept: FAMILY MEDICINE CLINIC | Facility: CLINIC | Age: 53
End: 2023-12-04
Payer: COMMERCIAL

## 2023-12-04 VITALS
BODY MASS INDEX: 25.23 KG/M2 | SYSTOLIC BLOOD PRESSURE: 124 MMHG | DIASTOLIC BLOOD PRESSURE: 80 MMHG | HEIGHT: 63 IN | WEIGHT: 142.4 LBS

## 2023-12-04 DIAGNOSIS — R09.81 HEAD CONGESTION: Primary | ICD-10-CM

## 2023-12-04 DIAGNOSIS — J30.9 ALLERGIC RHINITIS, UNSPECIFIED SEASONALITY, UNSPECIFIED TRIGGER: ICD-10-CM

## 2023-12-04 DIAGNOSIS — J01.90 ACUTE SINUSITIS, RECURRENCE NOT SPECIFIED, UNSPECIFIED LOCATION: ICD-10-CM

## 2023-12-04 PROCEDURE — 99213 OFFICE O/P EST LOW 20 MIN: CPT | Performed by: FAMILY MEDICINE

## 2023-12-04 PROCEDURE — 96372 THER/PROPH/DIAG INJ SC/IM: CPT | Performed by: FAMILY MEDICINE

## 2023-12-04 RX ORDER — AZITHROMYCIN 250 MG/1
TABLET, FILM COATED ORAL
Qty: 6 TABLET | Refills: 0 | Status: SHIPPED | OUTPATIENT
Start: 2023-12-04

## 2023-12-04 RX ORDER — TRIAMCINOLONE ACETONIDE 40 MG/ML
40 INJECTION, SUSPENSION INTRA-ARTICULAR; INTRAMUSCULAR ONCE
Status: COMPLETED | OUTPATIENT
Start: 2023-12-04 | End: 2023-12-04

## 2023-12-04 RX ADMIN — TRIAMCINOLONE ACETONIDE 40 MG: 40 INJECTION, SUSPENSION INTRA-ARTICULAR; INTRAMUSCULAR at 16:37

## 2023-12-04 ASSESSMENT — PATIENT HEALTH QUESTIONNAIRE - PHQ9
SUM OF ALL RESPONSES TO PHQ9 QUESTIONS 1 & 2: 0
1. LITTLE INTEREST OR PLEASURE IN DOING THINGS: 0
SUM OF ALL RESPONSES TO PHQ QUESTIONS 1-9: 0
2. FEELING DOWN, DEPRESSED OR HOPELESS: 0
SUM OF ALL RESPONSES TO PHQ QUESTIONS 1-9: 0

## 2024-02-13 ENCOUNTER — OFFICE VISIT (OUTPATIENT)
Dept: OBGYN CLINIC | Age: 54
End: 2024-02-13

## 2024-02-13 ENCOUNTER — PROCEDURE VISIT (OUTPATIENT)
Dept: OBGYN CLINIC | Age: 54
End: 2024-02-13
Payer: COMMERCIAL

## 2024-02-13 VITALS — BODY MASS INDEX: 25.15 KG/M2 | WEIGHT: 142 LBS

## 2024-02-13 DIAGNOSIS — N92.0 MENORRHAGIA WITH REGULAR CYCLE: Primary | ICD-10-CM

## 2024-02-13 DIAGNOSIS — N92.4 ABNORMAL PERIMENOPAUSAL BLEEDING: Primary | ICD-10-CM

## 2024-02-13 PROCEDURE — 76830 TRANSVAGINAL US NON-OB: CPT | Performed by: OBSTETRICS & GYNECOLOGY

## 2024-02-13 NOTE — PROGRESS NOTES
Follow up visit    Maeve Aldridge   53 y.o.  1970  [id number]    Today:  24       HPI:    Patient could not wait after her ultrasound, had to get back to work    24  US today:  ANTEVERTED HETEROGENOUS UTERUS, , vol 89, WITH SOME AREAS OF POSSIBLE FOCAL ADENOMYOSIS   POSSIBLE SS FUND FIBROID 1.5 X 1. X 1.6CM (POORLY VISUALIZED D/T BOWEL GAS)   ENDOMETRIUM -4.8MM   RT OV- APPEARS WNL WITH SOME FOLLICLES   LT OV- APPEARS WNL WITH SOME FOLLICLES     NO FREE FLUID      BC:       2022 [facility] CT:     Pelvis: Cervix appears to be of lower attenuation than the myometrium. Adjacent vaginal wall appears to be possibly thickened. Normal cervical appearance is variable but direct visualization and Pap smear suggested to exclude potential for pathology in this area. Neoplastic disease can potentially present with this appearance. Endometrial thickness measures approximately 12 mm. This may be normal in a luteal phase of a normal menstrual cycle but in a postmenopausal patient is abnormally thickened and raises concern for hyperplasia and/or neoplasia. Endometrial thickening can also be seen with cervical stenosis and/or obstruction.   Impression:    1. Uterine cervix appears prominent with possible decreased attenuation of the cervix and thickening of the adjacent vaginal walls. Further evaluation to assess for inflammatory or neoplastic etiology suggested.   2. Endometrial thickness is 12 mm. This is nonspecific but can be abnormal in due to hyperplasia in a postmenopausal patient.      Patient's last menstrual period as 10/06/2022.     A/P:  1. Follow up:  53 yo, , continued monthly menses, ? perimenopause?  Denies hot flashes/night sweats, ? fsh.  Thickened endometrium per CT:  EMB collected today, ? fsh.      2.  Mom uterine cancer, pt attributes to IUD her mom had in for ~ 50 y, was removed ~ 1.5 y ago and \"scratched the lining\" during removal thus causing cancer.      3.  /88-->HTN

## 2024-02-20 ENCOUNTER — OFFICE VISIT (OUTPATIENT)
Dept: OBGYN CLINIC | Age: 54
End: 2024-02-20
Payer: COMMERCIAL

## 2024-02-20 VITALS
SYSTOLIC BLOOD PRESSURE: 140 MMHG | BODY MASS INDEX: 25.16 KG/M2 | DIASTOLIC BLOOD PRESSURE: 82 MMHG | HEIGHT: 63 IN | WEIGHT: 142 LBS

## 2024-02-20 DIAGNOSIS — N92.0 MENORRHAGIA WITH REGULAR CYCLE: Primary | ICD-10-CM

## 2024-02-20 DIAGNOSIS — N92.4 ABNORMAL PERIMENOPAUSAL BLEEDING: ICD-10-CM

## 2024-02-20 DIAGNOSIS — R93.89 THICKENED ENDOMETRIUM: ICD-10-CM

## 2024-02-20 PROCEDURE — 99214 OFFICE O/P EST MOD 30 MIN: CPT | Performed by: OBSTETRICS & GYNECOLOGY

## 2024-02-28 NOTE — PROGRESS NOTES
Follow up visit    Maeve Aldridge   53 y.o.  1970  [id number]    Today:  24       Chief Complaint   Patient presents with    Vaginal Discharge    Follow-up     U/S results     Reports   1) vaginal discharge, her period may be starting soon.  2) other concerns: Hot flashes & night sweats, changed her blankets is using lighter weight blankets.  Sleeps nude, night sweats typically last ~1 hour    24  US today:  ANTEVERTED HETEROGENOUS UTERUS, , vol 88, WITH SOME AREAS OF POSSIBLE FOCAL ADENOMYOSIS   POSSIBLE SS FUND FIBROID 1.5 X 1. X 1.6CM (POORLY VISUALIZED D/T BOWEL GAS)   ENDOMETRIUM -4.8MM   RT OV- APPEARS WNL WITH SOME FOLLICLES   LT OV- APPEARS WNL WITH SOME FOLLICLES   NO FREE FLUID     BC:  tubal ligation          2022 [facility] CT:     Pelvis: Cervix appears to be of lower attenuation than the myometrium. Adjacent vaginal wall appears to be possibly thickened. Normal cervical appearance is variable but direct visualization and Pap smear suggested to exclude potential for pathology in this area. Neoplastic disease can potentially present with this appearance. Endometrial thickness measures approximately 12 mm. This may be normal in a luteal phase of a normal menstrual cycle but in a postmenopausal patient is abnormally thickened and raises concern for hyperplasia and/or neoplasia. Endometrial thickening can also be seen with cervical stenosis and/or obstruction.   Impression:    1. Uterine cervix appears prominent with possible decreased attenuation of the cervix and thickening of the adjacent vaginal walls. Further evaluation to assess for inflammatory or neoplastic etiology suggested.   2. Endometrial thickness is 12 mm. This is nonspecific but can be abnormal in due to hyperplasia in a postmenopausal patient.      Patient's last menstrual period as 10/06/2022.     A/P:  1. Follow up:  51 yo, , continued monthly menses, ? perimenopause?  Denies hot flashes/night sweats, ?

## 2024-10-31 ENCOUNTER — LAB (OUTPATIENT)
Dept: FAMILY MEDICINE CLINIC | Facility: CLINIC | Age: 54
End: 2024-10-31
Payer: COMMERCIAL

## 2024-10-31 DIAGNOSIS — Z00.00 LABORATORY EXAMINATION ORDERED AS PART OF A ROUTINE GENERAL MEDICAL EXAMINATION: Primary | ICD-10-CM

## 2024-10-31 LAB
ALBUMIN SERPL-MCNC: 3.5 G/DL (ref 3.5–5)
ALBUMIN/GLOB SERPL: 1.2 (ref 1–1.9)
ALP SERPL-CCNC: 79 U/L (ref 35–104)
ALT SERPL-CCNC: 14 U/L (ref 8–45)
ANION GAP SERPL CALC-SCNC: 9 MMOL/L (ref 7–16)
AST SERPL-CCNC: 16 U/L (ref 15–37)
BILIRUB SERPL-MCNC: 0.3 MG/DL (ref 0–1.2)
BUN SERPL-MCNC: 10 MG/DL (ref 6–23)
CALCIUM SERPL-MCNC: 8.9 MG/DL (ref 8.8–10.2)
CHLORIDE SERPL-SCNC: 107 MMOL/L (ref 98–107)
CHOLEST SERPL-MCNC: 133 MG/DL (ref 0–200)
CO2 SERPL-SCNC: 25 MMOL/L (ref 20–29)
CREAT SERPL-MCNC: 0.76 MG/DL (ref 0.6–1.1)
GLOBULIN SER CALC-MCNC: 2.9 G/DL (ref 2.3–3.5)
GLUCOSE SERPL-MCNC: 99 MG/DL (ref 70–99)
GRANS ABSOLUTE, POC: 3.9 K/UL
GRANULOCYTES %, POC: 54.1 %
HDLC SERPL-MCNC: 55 MG/DL (ref 40–60)
HDLC SERPL: 2.4 (ref 0–5)
HEMATOCRIT, POC: ABNORMAL %
HEMOGLOBIN, POC: ABNORMAL G/DL
LDLC SERPL CALC-MCNC: 59 MG/DL (ref 0–100)
LYMPHOCYTE %, POC: 38.8 %
LYMPHS ABSOLUTE, POC: 2.8 K/UL
MCH, POC: ABNORMAL PG (ref 40–?)
MCHC, POC: 31.5
MCV, POC: 81.8
MONOCYTE %, POC: 7.1 %
MONOCYTE, ABSOLUTE POC: 0.5 K/UL
MPV, POC: 8 FL
PLATELET COUNT, POC: 352 K/UL
POTASSIUM SERPL-SCNC: 4.1 MMOL/L (ref 3.5–5.1)
PROT SERPL-MCNC: 6.4 G/DL (ref 6.3–8.2)
RBC, POC: ABNORMAL M/UL
RDW, POC: ABNORMAL %
SODIUM SERPL-SCNC: 140 MMOL/L (ref 136–145)
TRIGL SERPL-MCNC: 96 MG/DL (ref 0–150)
TSH, 3RD GENERATION: 1.83 UIU/ML (ref 0.27–4.2)
VLDLC SERPL CALC-MCNC: 19 MG/DL (ref 6–23)
WBC, POC: 7.3 K/UL

## 2024-10-31 PROCEDURE — 85025 COMPLETE CBC W/AUTO DIFF WBC: CPT | Performed by: FAMILY MEDICINE

## 2024-11-07 ENCOUNTER — OFFICE VISIT (OUTPATIENT)
Dept: FAMILY MEDICINE CLINIC | Facility: CLINIC | Age: 54
End: 2024-11-07
Payer: COMMERCIAL

## 2024-11-07 VITALS
WEIGHT: 148.8 LBS | HEART RATE: 69 BPM | BODY MASS INDEX: 26.36 KG/M2 | DIASTOLIC BLOOD PRESSURE: 78 MMHG | SYSTOLIC BLOOD PRESSURE: 122 MMHG | OXYGEN SATURATION: 99 % | HEIGHT: 63 IN

## 2024-11-07 DIAGNOSIS — Z00.00 ROUTINE GENERAL MEDICAL EXAMINATION AT A HEALTH CARE FACILITY: Primary | ICD-10-CM

## 2024-11-07 DIAGNOSIS — D50.9 MICROCYTIC ANEMIA: ICD-10-CM

## 2024-11-07 PROCEDURE — 99396 PREV VISIT EST AGE 40-64: CPT | Performed by: FAMILY MEDICINE

## 2024-11-07 RX ORDER — ACETAMINOPHEN 160 MG
2000 TABLET,DISINTEGRATING ORAL DAILY
COMMUNITY

## 2024-11-07 RX ORDER — VITAMIN B COMPLEX
1 CAPSULE ORAL DAILY
COMMUNITY

## 2024-11-07 RX ORDER — FOLIC ACID 0.8 MG
TABLET ORAL
COMMUNITY

## 2024-11-07 ASSESSMENT — PATIENT HEALTH QUESTIONNAIRE - PHQ9
SUM OF ALL RESPONSES TO PHQ QUESTIONS 1-9: 0
SUM OF ALL RESPONSES TO PHQ9 QUESTIONS 1 & 2: 0
SUM OF ALL RESPONSES TO PHQ QUESTIONS 1-9: 0
2. FEELING DOWN, DEPRESSED OR HOPELESS: NOT AT ALL
SUM OF ALL RESPONSES TO PHQ QUESTIONS 1-9: 0
SUM OF ALL RESPONSES TO PHQ QUESTIONS 1-9: 0
1. LITTLE INTEREST OR PLEASURE IN DOING THINGS: NOT AT ALL

## 2024-11-07 NOTE — PROGRESS NOTES
\"Have you been to the ER, urgent care clinic since your last visit?  Hospitalized since your last visit?\"    NO    “Have you seen or consulted any other health care providers outside our system since your last visit?”    NO      “Have you had a colorectal cancer screening such as a colonoscopy/FIT/Cologuard?    YES - Type: Colonoscopy - Where: [facility]MA to request most recent records if not in the chart     No colonoscopy on file  No cologuard on file  No FIT/FOBT on file   No flexible sigmoidoscopy on file           
PLAN    1.  CPX.  Anticipatory guidance discussed including the importance of sunscreen use, helmet use and seatbelt use.  Screening colonoscopy, mammography and Pap smear are up-to-date.  Blood pressure is 122/78.  Flu vaccine is up-to-date.  Screening TSH, metabolic panel and lipid panel are unremarkable.    2.  Microcytic anemia.  Patient has a history of microcytic anemia.  Hemoglobin is 9.4.  Previously 10.8.  MCV 81.8.  Etiology was felt to be menorrhagia.  Colonoscopy normal in 2022.  She continues to have intermittently heavy menses.  I instructed her to start Slow Fe 1 a day.  She has an appointment next week with her GYN.  She is instructed to discuss this with her.  She agrees.  Hopefully the patient is approaching menopause and her menorrhagia will naturally subside.    Elements of this note have been dictated using speech recognition software. As a result, errors of speech recognition may have occurred.

## 2025-01-13 SDOH — ECONOMIC STABILITY: TRANSPORTATION INSECURITY
IN THE PAST 12 MONTHS, HAS THE LACK OF TRANSPORTATION KEPT YOU FROM MEDICAL APPOINTMENTS OR FROM GETTING MEDICATIONS?: NO

## 2025-01-13 SDOH — ECONOMIC STABILITY: FOOD INSECURITY: WITHIN THE PAST 12 MONTHS, THE FOOD YOU BOUGHT JUST DIDN'T LAST AND YOU DIDN'T HAVE MONEY TO GET MORE.: NEVER TRUE

## 2025-01-13 SDOH — ECONOMIC STABILITY: INCOME INSECURITY: IN THE LAST 12 MONTHS, WAS THERE A TIME WHEN YOU WERE NOT ABLE TO PAY THE MORTGAGE OR RENT ON TIME?: NO

## 2025-01-13 SDOH — ECONOMIC STABILITY: FOOD INSECURITY: WITHIN THE PAST 12 MONTHS, YOU WORRIED THAT YOUR FOOD WOULD RUN OUT BEFORE YOU GOT MONEY TO BUY MORE.: NEVER TRUE

## 2025-01-14 ENCOUNTER — OFFICE VISIT (OUTPATIENT)
Dept: OBGYN CLINIC | Age: 55
End: 2025-01-14
Payer: COMMERCIAL

## 2025-01-14 VITALS
SYSTOLIC BLOOD PRESSURE: 136 MMHG | BODY MASS INDEX: 26.15 KG/M2 | WEIGHT: 147.6 LBS | HEIGHT: 63 IN | DIASTOLIC BLOOD PRESSURE: 82 MMHG

## 2025-01-14 DIAGNOSIS — Z11.51 SCREENING FOR HPV (HUMAN PAPILLOMAVIRUS): ICD-10-CM

## 2025-01-14 DIAGNOSIS — Z12.11 SCREENING FOR COLON CANCER: ICD-10-CM

## 2025-01-14 DIAGNOSIS — Z12.31 ENCOUNTER FOR SCREENING MAMMOGRAM FOR MALIGNANT NEOPLASM OF BREAST: ICD-10-CM

## 2025-01-14 DIAGNOSIS — N92.0 MENORRHAGIA WITH REGULAR CYCLE: ICD-10-CM

## 2025-01-14 DIAGNOSIS — Z01.419 WELL WOMAN EXAM WITH ROUTINE GYNECOLOGICAL EXAM: Primary | ICD-10-CM

## 2025-01-14 DIAGNOSIS — Z12.4 SCREENING FOR CERVICAL CANCER: ICD-10-CM

## 2025-01-14 DIAGNOSIS — D50.0 IRON DEFICIENCY ANEMIA DUE TO CHRONIC BLOOD LOSS: ICD-10-CM

## 2025-01-14 LAB
BASOPHILS # BLD: 0.03 K/UL (ref 0–0.2)
BASOPHILS NFR BLD: 0.5 % (ref 0–2)
DIFFERENTIAL METHOD BLD: ABNORMAL
EOSINOPHIL # BLD: 0.08 K/UL (ref 0–0.8)
EOSINOPHIL NFR BLD: 1.4 % (ref 0.5–7.8)
ERYTHROCYTE [DISTWIDTH] IN BLOOD BY AUTOMATED COUNT: 18.4 % (ref 11.9–14.6)
HCT VFR BLD AUTO: 32.8 % (ref 35.8–46.3)
HGB BLD-MCNC: 9.9 G/DL (ref 11.7–15.4)
IMM GRANULOCYTES # BLD AUTO: 0.01 K/UL (ref 0–0.5)
IMM GRANULOCYTES NFR BLD AUTO: 0.2 % (ref 0–5)
LYMPHOCYTES # BLD: 1.84 K/UL (ref 0.5–4.6)
LYMPHOCYTES NFR BLD: 33.2 % (ref 13–44)
MCH RBC QN AUTO: 24.3 PG (ref 26.1–32.9)
MCHC RBC AUTO-ENTMCNC: 30.2 G/DL (ref 31.4–35)
MCV RBC AUTO: 80.6 FL (ref 82–102)
MONOCYTES # BLD: 0.38 K/UL (ref 0.1–1.3)
MONOCYTES NFR BLD: 6.8 % (ref 4–12)
NEUTS SEG # BLD: 3.21 K/UL (ref 1.7–8.2)
NEUTS SEG NFR BLD: 57.9 % (ref 43–78)
NRBC # BLD: 0 K/UL (ref 0–0.2)
PLATELET # BLD AUTO: 369 K/UL (ref 150–450)
PMV BLD AUTO: 10.4 FL (ref 9.4–12.3)
RBC # BLD AUTO: 4.07 M/UL (ref 4.05–5.2)
WBC # BLD AUTO: 5.6 K/UL (ref 4.3–11.1)

## 2025-01-14 PROCEDURE — 99396 PREV VISIT EST AGE 40-64: CPT | Performed by: OBSTETRICS & GYNECOLOGY

## 2025-01-14 SDOH — ECONOMIC STABILITY: FOOD INSECURITY: WITHIN THE PAST 12 MONTHS, THE FOOD YOU BOUGHT JUST DIDN'T LAST AND YOU DIDN'T HAVE MONEY TO GET MORE.: NEVER TRUE

## 2025-01-14 SDOH — ECONOMIC STABILITY: FOOD INSECURITY: WITHIN THE PAST 12 MONTHS, YOU WORRIED THAT YOUR FOOD WOULD RUN OUT BEFORE YOU GOT MONEY TO BUY MORE.: NEVER TRUE

## 2025-01-14 ASSESSMENT — PATIENT HEALTH QUESTIONNAIRE - PHQ9
2. FEELING DOWN, DEPRESSED OR HOPELESS: NOT AT ALL
SUM OF ALL RESPONSES TO PHQ9 QUESTIONS 1 & 2: 0
SUM OF ALL RESPONSES TO PHQ QUESTIONS 1-9: 0
2. FEELING DOWN, DEPRESSED OR HOPELESS: NOT AT ALL
1. LITTLE INTEREST OR PLEASURE IN DOING THINGS: NOT AT ALL
SUM OF ALL RESPONSES TO PHQ QUESTIONS 1-9: 0
1. LITTLE INTEREST OR PLEASURE IN DOING THINGS: NOT AT ALL
SUM OF ALL RESPONSES TO PHQ QUESTIONS 1-9: 0
SUM OF ALL RESPONSES TO PHQ QUESTIONS 1-9: 0
SUM OF ALL RESPONSES TO PHQ9 QUESTIONS 1 & 2: 0
SUM OF ALL RESPONSES TO PHQ QUESTIONS 1-9: 0

## 2025-01-15 LAB
FERRITIN SERPL-MCNC: 17 NG/ML (ref 8–388)
FSH SERPL-ACNC: 40 MIU/ML

## 2025-01-15 NOTE — PROGRESS NOTES
Annual Exam  Established pt.      Maeve Aldridge   54 y.o.  1970  [id number]    Today:  25     Presents for well woman annual exam.  54 years old,  ( x 3)    Reports: Irregular periods, had amenorrhea for 2-3 months, the following month her period resumed was very heavy, lasted ~7 days  Denies hot flashes/night sweats    BC:   tubal ligation.         History reviewed. No pertinent past medical history.    Past Surgical History:   Procedure Laterality Date    COLONOSCOPY      2022    TUBAL LIGATION         Family History   Problem Relation Age of Onset    Uterine Cancer Mother         Dr. Hunt: IUD scratched her-->txd w/ megace  ~ 2.5 y, cancer resolved.    Cancer Mother     Diabetes Father     Breast Cancer Neg Hx        OB History    Para Term  AB Living   4 4 3 1 0 3   SAB IAB Ectopic Molar Multiple Live Births   0 0 0 0 0 3      # Outcome Date GA Lbr Bryan/2nd Weight Sex Type Anes PTL Lv   4 Term      Vag-Spont   YUMIKO   3       Vag-Spont   YUMIKO   2 Term      Vag-Spont   YUMIKO   1 Term               Obstetric Comments    ( x 3)       Social History     Socioeconomic History    Marital status: Single     Spouse name: None    Number of children: None    Years of education: None    Highest education level: None   Tobacco Use    Smoking status: Never    Smokeless tobacco: Never   Vaping Use    Vaping status: Never Used   Substance and Sexual Activity    Alcohol use: Never    Drug use: Never    Sexual activity: Yes     Partners: Male     Birth control/protection: Surgical     Comment: Tubal     Social Determinants of Health     Financial Resource Strain: Low Risk  (2023)    Overall Financial Resource Strain (CARDIA)     Difficulty of Paying Living Expenses: Not very hard   Food Insecurity: No Food Insecurity (2025)    Hunger Vital Sign     Worried About Running Out of Food in the Last Year: Never true     Ran Out of Food in the Last Year:

## 2025-01-16 ENCOUNTER — TELEPHONE (OUTPATIENT)
Age: 55
End: 2025-01-16

## 2025-01-16 NOTE — TELEPHONE ENCOUNTER
Returned patient and patient mentioned that she isn't due for a colonoscopy until 2032. Pt will contact pcp to verify why referral was sent. Removed patient from referral que

## 2025-01-20 LAB
COLLECTION METHOD: NORMAL
CYTOLOGIST CVX/VAG CYTO: NORMAL
CYTOLOGY CVX/VAG DOC THIN PREP: NORMAL
DATE OF LMP: NORMAL
HPV APTIMA: NEGATIVE
HPV GENOTYPE REFLEX: NORMAL
Lab: NORMAL
OTHER PT INFO: NORMAL
PAP SOURCE: NORMAL
PATH REPORT.FINAL DX SPEC: NORMAL
PREV CYTO INFO: NORMAL
PREV TREATMENT RESULTS: NORMAL
PREV TREATMENT: NORMAL
STAT OF ADQ CVX/VAG CYTO-IMP: NORMAL

## 2025-02-25 ENCOUNTER — HOSPITAL ENCOUNTER (OUTPATIENT)
Dept: MAMMOGRAPHY | Age: 55
Discharge: HOME | End: 2025-02-28
Attending: OBSTETRICS & GYNECOLOGY
Payer: COMMERCIAL

## 2025-02-25 VITALS — WEIGHT: 148 LBS | HEIGHT: 63 IN | BODY MASS INDEX: 26.22 KG/M2

## 2025-02-25 DIAGNOSIS — Z12.31 ENCOUNTER FOR SCREENING MAMMOGRAM FOR MALIGNANT NEOPLASM OF BREAST: ICD-10-CM

## 2025-02-25 PROCEDURE — 77063 BREAST TOMOSYNTHESIS BI: CPT
